# Patient Record
Sex: MALE | Race: WHITE | URBAN - METROPOLITAN AREA
[De-identification: names, ages, dates, MRNs, and addresses within clinical notes are randomized per-mention and may not be internally consistent; named-entity substitution may affect disease eponyms.]

---

## 2020-02-19 ENCOUNTER — OFFICE VISIT (OUTPATIENT)
Dept: PULMONOLOGY | Age: 75
End: 2020-02-19
Payer: MEDICARE

## 2020-02-19 VITALS
OXYGEN SATURATION: 97 % | HEART RATE: 75 BPM | SYSTOLIC BLOOD PRESSURE: 134 MMHG | DIASTOLIC BLOOD PRESSURE: 60 MMHG | HEIGHT: 70 IN | BODY MASS INDEX: 30.64 KG/M2 | WEIGHT: 214 LBS

## 2020-02-19 PROBLEM — I15.2 HYPERTENSION ASSOCIATED WITH DIABETES (HCC): Status: ACTIVE | Noted: 2017-01-04

## 2020-02-19 PROBLEM — G47.33 OBSTRUCTIVE SLEEP APNEA SYNDROME: Chronic | Status: ACTIVE | Noted: 2020-02-19

## 2020-02-19 PROBLEM — E66.9 OBESITY (BMI 30-39.9): Status: ACTIVE | Noted: 2019-08-26

## 2020-02-19 PROBLEM — E66.9 OBESITY (BMI 30-39.9): Chronic | Status: ACTIVE | Noted: 2019-08-26

## 2020-02-19 PROBLEM — E11.59 HYPERTENSION ASSOCIATED WITH DIABETES (HCC): Status: ACTIVE | Noted: 2017-01-04

## 2020-02-19 PROBLEM — E11.59 HYPERTENSION ASSOCIATED WITH DIABETES (HCC): Chronic | Status: ACTIVE | Noted: 2017-01-04

## 2020-02-19 PROBLEM — G47.33 OBSTRUCTIVE SLEEP APNEA SYNDROME: Status: ACTIVE | Noted: 2020-02-19

## 2020-02-19 PROBLEM — F32.A DEPRESSION: Status: ACTIVE | Noted: 2020-02-19

## 2020-02-19 PROBLEM — J30.9 ALLERGIC RHINITIS: Status: ACTIVE | Noted: 2020-02-19

## 2020-02-19 PROBLEM — I15.2 HYPERTENSION ASSOCIATED WITH DIABETES (HCC): Chronic | Status: ACTIVE | Noted: 2017-01-04

## 2020-02-19 PROCEDURE — 99204 OFFICE O/P NEW MOD 45 MIN: CPT | Performed by: INTERNAL MEDICINE

## 2020-02-19 ASSESSMENT — ENCOUNTER SYMPTOMS
CHOKING: 0
NAUSEA: 0
SHORTNESS OF BREATH: 0
PHOTOPHOBIA: 0
ALLERGIC/IMMUNOLOGIC NEGATIVE: 1
VOMITING: 0
EYE PAIN: 0
CHEST TIGHTNESS: 0
APNEA: 0
ABDOMINAL DISTENTION: 0
ABDOMINAL PAIN: 0
RHINORRHEA: 0

## 2020-02-19 ASSESSMENT — SLEEP AND FATIGUE QUESTIONNAIRES
HOW LIKELY ARE YOU TO NOD OFF OR FALL ASLEEP WHILE WATCHING TV: 1
HOW LIKELY ARE YOU TO NOD OFF OR FALL ASLEEP WHILE SITTING QUIETLY AFTER LUNCH WITHOUT ALCOHOL: 2
HOW LIKELY ARE YOU TO NOD OFF OR FALL ASLEEP WHEN YOU ARE A PASSENGER IN A CAR FOR AN HOUR WITHOUT A BREAK: 2
HOW LIKELY ARE YOU TO NOD OFF OR FALL ASLEEP WHILE SITTING AND TALKING TO SOMEONE: 0
HOW LIKELY ARE YOU TO NOD OFF OR FALL ASLEEP WHILE LYING DOWN TO REST IN THE AFTERNOON WHEN CIRCUMSTANCES PERMIT: 3
ESS TOTAL SCORE: 10
HOW LIKELY ARE YOU TO NOD OFF OR FALL ASLEEP WHILE SITTING INACTIVE IN A PUBLIC PLACE: 1
NECK CIRCUMFERENCE (INCHES): 17
HOW LIKELY ARE YOU TO NOD OFF OR FALL ASLEEP WHILE SITTING AND READING: 1
HOW LIKELY ARE YOU TO NOD OFF OR FALL ASLEEP IN A CAR, WHILE STOPPED FOR A FEW MINUTES IN TRAFFIC: 0

## 2020-02-19 NOTE — LETTER
he gets a new machine. 12 month f/u. Pt would medically benefit from wt loss for LELAND (diet, exercise, surgical). If you have questions or concerns, please do not hesitate to call me. I look forward to following Charlene Clemente along with you.     Sincerely,      Susy Reza MD     providers:  Kimi Medina. 41: [phone]

## 2020-02-19 NOTE — PROGRESS NOTES
Lizbeth Chavez MD, FAASM, [facility]  [facility] HEART AND SURGICAL [address]  3rd Floor,  [address] [facility], [facility] [place] Jaime E [phone]   [facility] [address] Rainer Isaac. [address] [facility]. Jessica [address] [phone]     [place] [address]  Dept: [phone]  Loc: [phone]    Assessment:      Visit Diagnoses and Associated Orders     Obstructive sleep apnea syndrome   (New Problem)  -  Primary    Reviewed old records, on Tx         Hypertension associated with diabetes ([address]   (Stable)           Type 2 diabetes mellitus with microalbuminuria, with long-term current use of insulin (HCC)   (Stable)           Allergic rhinitis, unspecified seasonality, unspecified trigger   (Stable)           Depression, unspecified depression type   (Stable)           Obesity (BMI 30-39.9)   (Stable)                  Plan:      Reviewed old records, pertinent data listed. Reviewed compliance download with pt. Supplies and parts as needed for his machine. These are medically necessary. Continue medications per his PCP and other physicians. Limit caffeine use after 3pm.  Encouraged him to work on weight loss through diet and exercise. The primary encounter diagnosis was Obstructive sleep apnea syndrome. Diagnoses of Hypertension associated with diabetes ([address] Type 2 diabetes mellitus with microalbuminuria, with long-term current use of insulin ([address] Allergic rhinitis, unspecified seasonality, unspecified trigger, Depression, unspecified depression type, and Obesity (BMI 30-39. 9) were also pertinent to this visit. The chronic medical conditions listed are directly related to the primary diagnosis listed above. The management of the primary diagnosis affects the secondary diagnosis and vice versa. May needs a new machine at any time. Needs repeat bilevel titration when he gets a new machine. 12 month f/u.     Pt would medically benefit from wt loss for LELAND (diet, exercise, surgical). Subjective:     Patient ID: Monae Bergeron is a 69 y.o. male. Chief Complaint   Patient presents with    Sleep Apnea       HPI:      Monae Bergeron is a 69 y.o. male self-referred for a sleep evaluation. He complains of: snoring, witnessed apneas, excessive daytime sleepiness , non-restorative sleep, nocturia and tossing and turning at night. He denies: cataplexy and hypnagogic hallucinations. Symptoms began 7 years ago, controlled since that time on bilevel. Patient has previously failed CPAP and was changed to bilevel. He continues to do well with his machine at the current settings. No issues with EDS, snoring, or apneas. He is waking refreshed, for the most part, in the am.  No HA, dryness, or congestion. Having some mask leak issues, wants to try a full face mask. Machine Modem/Download Info:  Compliance (hours/night): 8 hrs/night  Download AHI (/hour): 3 /HR  Average IPAP Pressure: 12.9 cmH2O  Average EPAP Pressure: 9.7 cmH2O AUTO BIPAP - Settings (Charlette)  IPAP Max: 20 cmH2O  EPAP Min: 9 cmH2O  Pressure Support Min: 2  Pressure Support Max: 8   Comfort Settings  Humidity Level (0-8): 5  Heated Tubing (Yes/No): No  Flex/EPR (0-3): 1 PAP Mask  Mask Type: Nasal mask  Mask Model: Dreamwear  Mask Size: sm     Hypertension, diabetes mellitus, allergic rhinitis, depression, and obesity: stable on meds and followed by pt's PCP and other physicians. Previous evaluation and treatment has included- reviewed studies from [place] [address]:  polysomnography done on 7/8/04 showed AHI-102.9/hr with low sat-82%. Had CPAP titration on 8/10/04 and 12/22/09. DOT/CDL - No  FAA/'s license -No    Previous Report(s) Reviewed: historical medical records, office notes, andreferral letter(s). Pertinent data has been documented.     [place] - Total score: 10    Caffeine Intake - Coffee: 1 cup(s) per day    Social History     Socioeconomic History    Marital status:      Spouse name: Not on file    Number of children: Not on file    Years of education: Not on file    Highest education level: Not on file   Occupational History    Not on file   Social Needs    Financial resource strain: Not on file    Food insecurity:     Worry: Not on file     Inability: Not on file    Transportation needs:     Medical: Not on file     Non-medical: Not on file   Tobacco Use    Smoking status: Never Smoker    Smokeless tobacco: Never Used   Substance and Sexual Activity    Alcohol use: Yes     Comment: RARELY    Drug use: No    Sexual activity: Not on file   Lifestyle    Physical activity:     Days per week: Not on file     Minutes per session: Not on file    Stress: Not on file   Relationships    Social connections:     Talks on phone: Not on file     Gets together: Not on file     Attends Religion service: Not on file     Active member of club or organization: Not on file     Attends meetings of clubs or organizations: Not on file     Relationship status: Not on file    Intimate partner violence:     Fear of current or ex partner: Not on file     Emotionally abused: Not on file     Physically abused: Not on file     Forced sexual activity: Not on file   Other Topics Concern    Not on file   Social History Narrative    Not on file        Current Outpatient Medications   Medication Sig Dispense Refill    lisinopril (PRINIVIL;ZESTRIL) 10 MG tablet Take 20 mg by mouth daily       insulin detemir (LEVEMIR FLEXPEN) 100 UNIT/ML injection pen Inject 55 Units into the skin nightly       sertraline (ZOLOFT) 50 MG tablet Take 50 mg by mouth daily.  cetirizine (ZYRTEC) 5 MG tablet Take 5 mg by mouth daily.  metFORMIN (GLUCOPHAGE) 500 MG tablet Take 500 mg by mouth 2 times daily (with meals). No current facility-administered medications for this visit.         Allergies as of 02/19/2020 - Review Complete 02/19/2020   Allergen Reaction

## 2021-04-05 ENCOUNTER — OFFICE VISIT (OUTPATIENT)
Dept: PULMONOLOGY | Age: 76
End: 2021-04-05
Payer: MEDICARE

## 2021-04-05 VITALS
HEIGHT: 70 IN | OXYGEN SATURATION: 95 % | WEIGHT: 212 LBS | BODY MASS INDEX: 30.35 KG/M2 | HEART RATE: 70 BPM | SYSTOLIC BLOOD PRESSURE: 123 MMHG | DIASTOLIC BLOOD PRESSURE: 73 MMHG

## 2021-04-05 DIAGNOSIS — E66.9 OBESITY (BMI 30-39.9): Chronic | ICD-10-CM

## 2021-04-05 DIAGNOSIS — E11.59 HYPERTENSION ASSOCIATED WITH DIABETES (HCC): Chronic | ICD-10-CM

## 2021-04-05 DIAGNOSIS — G47.33 OBSTRUCTIVE SLEEP APNEA SYNDROME: Chronic | ICD-10-CM

## 2021-04-05 DIAGNOSIS — I15.2 HYPERTENSION ASSOCIATED WITH DIABETES (HCC): Chronic | ICD-10-CM

## 2021-04-05 PROCEDURE — 99213 OFFICE O/P EST LOW 20 MIN: CPT | Performed by: INTERNAL MEDICINE

## 2021-04-05 ASSESSMENT — SLEEP AND FATIGUE QUESTIONNAIRES
HOW LIKELY ARE YOU TO NOD OFF OR FALL ASLEEP WHILE LYING DOWN TO REST IN THE AFTERNOON WHEN CIRCUMSTANCES PERMIT: 3
HOW LIKELY ARE YOU TO NOD OFF OR FALL ASLEEP WHILE SITTING AND TALKING TO SOMEONE: 1
HOW LIKELY ARE YOU TO NOD OFF OR FALL ASLEEP WHILE WATCHING TV: 2
HOW LIKELY ARE YOU TO NOD OFF OR FALL ASLEEP WHEN YOU ARE A PASSENGER IN A CAR FOR AN HOUR WITHOUT A BREAK: 2

## 2021-04-05 NOTE — PROGRESS NOTES
Naina Stephenson MD, Charisse Martinez, [facility]  Tiffanie Kehrt CNP  Junaidcandace Martinez CNP [address] [place] [address]  [place]ise[place] [address] [facility] [address] [facility] [phone]   [facility] [facility] [address] [place]. [address] [facility]. Jessica [address] [phone]     [place] [id number] [phone]      Assessment/Plan:     1. Obstructive sleep apnea syndrome  Assessment & Plan:  Chronic-Stable: Reviewed and analyzed results of physiologic download from patient's machine and reviewed with patient. Supplies and parts as needed for his machine. These are medically necessary. Limit caffeine use after 3pm. Based on the analyzed data will continue with current settings. Current machine is over 11years old and not functioning adequately. Patient needs a new machine, order has been placed to his DME company of choice. We will see the patient back in a week follow-up. 2. Hypertension associated with diabetes ([address]  Assessment & Plan:  Chronic- Stable. Discussed the importance of treating obstructive sleep apnea as part of the management of this disorder. Cont any meds per PCP and other physicians. 3. Obesity (BMI 30-39. 9)  Assessment & Plan:  Chronic-not stable:  Discussed importance of treating obstructive sleep apnea and getting sufficient sleep to assist with weight control. Encouraged him to work on weight loss through diet and exercise. Recommended DASH or Mediterranean diets. Reviewed, analyzed, and documented physiologic data from patient's PAP machine. This information was analyzed to assess complexity and medical decision making in regards to further testing and management. Diagnoses of Obstructive sleep apnea syndrome, Hypertension associated with diabetes ([address] and Obesity (BMI 30-39. 9) were pertinent to this visit. The chronic medical conditions listed are directly related to the primary diagnosis listed above.   The management of the primary diagnosis affects the secondary diagnosis and vice versa. Subjective:     Patient ID: Eric Schwarz is a 77 y.o. male. Chief Complaint   Patient presents with    Sleep Apnea    Fatigue       HPI:    Machine Modem/Download Info:  Compliance (hours/night): 8.3 hrs/night  Download AHI (/hour): 2.2 /HR  Average IPAP Pressure: 13.9 cmH2O  Average EPAP Pressure: 10 cmH2O   AUTO BIPAP - Settings (Charlette)  IPAP Max: 20 cmH2O  EPAP Min: 9 cmH2O  Pressure Support Min: 2  Pressure Support Max: 8   Comfort Settings  Humidity Level (0-8): 4  Flex/EPR (0-3): 1       Patient's machine is over 11years old and not turning on consistently. It is also missing the power button which makes it difficult to turn his machine on and off. Otherwise he feels he is sleeping well and the pressure feels good. Is using a chinstrap because his mouth opens and was not able to tolerate a full face mask.     [facility]    [place] - Total score: 14    Social History     Socioeconomic History    Marital status:      Spouse name: Not on file    Number of children: Not on file    Years of education: Not on file    Highest education level: Not on file   Occupational History    Not on file   Social Needs    Financial resource strain: Not on file    Food insecurity     Worry: Not on file     Inability: Not on file    Transportation needs     Medical: Not on file     Non-medical: Not on file   Tobacco Use    Smoking status: Never Smoker    Smokeless tobacco: Never Used   Substance and Sexual Activity    Alcohol use: Yes     Comment: RARELY    Drug use: No    Sexual activity: Not on file   Lifestyle    Physical activity     Days per week: Not on file     Minutes per session: Not on file    Stress: Not on file   Relationships    Social connections     Talks on phone: Not on file     Gets together: Not on file     Attends Holiness service: Not on file     Active member of club or organization: Not on file Attends meetings of clubs or organizations: Not on file     Relationship status: Not on file    Intimate partner violence     Fear of current or ex partner: Not on file     Emotionally abused: Not on file     Physically abused: Not on file     Forced sexual activity: Not on file   Other Topics Concern    Not on file   Social History Narrative    Not on file       Current Outpatient Medications   Medication Sig Dispense Refill    lisinopril (PRINIVIL;ZESTRIL) 10 MG tablet Take 20 mg by mouth daily       insulin detemir (LEVEMIR FLEXPEN) 100 UNIT/ML injection pen Inject 55 Units into the skin nightly       sertraline (ZOLOFT) 50 MG tablet Take 50 mg by mouth daily.  cetirizine (ZYRTEC) 5 MG tablet Take 5 mg by mouth daily.  metFORMIN (GLUCOPHAGE) 500 MG tablet Take 500 mg by mouth 2 times daily (with meals). No current facility-administered medications for this visit. Allergies as of 04/05/2021 - Review Complete 04/05/2021   Allergen Reaction Noted    Erythromycin base Nausea Only 10/02/2014       Patient Active Problem List   Diagnosis    Port-A-Cath in place    Malignant neoplasm of pancreas ([facility]    Encounter for follow-up examination after completed treatment for malignant neoplasm    Obstructive sleep apnea syndrome    Hyperlipidemia associated with type 2 diabetes mellitus ([facility]    Hypertension associated with diabetes ([facility]    Type 2 diabetes mellitus with microalbuminuria, with long-term current use of insulin ([facility])    Obesity (BMI 30-39. 9)    Allergic rhinitis    Depression            Vitals:  Weight BMI   Wt Readings from Last 3 Encounters:   04/05/21 212 lb (96.2 kg)   02/19/20 214 lb (97.1 kg)   04/19/16 219 lb (99.3 kg)    Body mass index is 30.42 kg/m².      BP HR SaO2   BP Readings from Last 3 Encounters:   04/05/21 123/73   02/19/20 134/60   04/19/16 126/70    Pulse Readings from Last 3 Encounters:   04/05/21 70   02/19/20 75   04/19/16 72    SpO2 Readings from Last 3 Encounters:   04/05/21 95%   02/19/20 97%   09/03/15 97%            Electronically signed by Yesy Dias MD on 4/5/2021 at 12:47 PM

## 2021-04-05 NOTE — ASSESSMENT & PLAN NOTE
Chronic-Stable: Reviewed and analyzed results of physiologic download from patient's machine and reviewed with patient. Supplies and parts as needed for his machine. These are medically necessary. Limit caffeine use after 3pm. Based on the analyzed data will continue with current settings. Current machine is over 11years old and not functioning adequately. Patient needs a new machine, order has been placed to his DME company of choice. We will see the patient back in a week follow-up.

## 2021-04-05 NOTE — LETTER
[facility] Sleep Medicine  [address] [facility]  Lisbeth Martinez  [address]  Phone: [phone]  Fax: [phone]    April 5, 2021       Patient: Ambrosio Cuevas   MR Number: [id number]   YOB: 1945   Date of Visit: 4/5/2021       Bienvenido Ramesh was seen for a follow up visit today. Here is my assessment and plan as well as an attached copy of his visit today:    Obstructive sleep apnea syndrome  Chronic-Stable: Reviewed and analyzed results of physiologic download from patient's machine and reviewed with patient. Supplies and parts as needed for his machine. These are medically necessary. Limit caffeine use after 3pm. Based on the analyzed data will continue with current settings. Current machine is over 11years old and not functioning adequately. Patient needs a new machine, order has been placed to his DME company of choice. We will see the patient back in a week follow-up. Hypertension associated with diabetes ([facility]  Chronic- Stable. Discussed the importance of treating obstructive sleep apnea as part of the management of this disorder. Cont any meds per PCP and other physicians. Obesity (BMI 30-39. 9)  Chronic-not stable:  Discussed importance of treating obstructive sleep apnea and getting sufficient sleep to assist with weight control. Encouraged him to work on weight loss through diet and exercise. Recommended DASH or Mediterranean diets. If you have questions or concerns, please do not hesitate to call me. I look forward to following Dixie Olivas along with you.     Sincerely,    Bhargavi Keith MD    CC providers:  [facility] [place]  [phone]  Via Fax: [phone]

## 2021-04-05 NOTE — ASSESSMENT & PLAN NOTE
Chronic-not stable:  Discussed importance of treating obstructive sleep apnea and getting sufficient sleep to assist with weight control. Encouraged him to work on weight loss through diet and exercise. Recommended DASH or Mediterranean diets.

## 2021-04-05 NOTE — PROGRESS NOTES
Duong Wood         : 1945    Diagnosis: [x] LELAND (G47.33) [] CSA (G47.31) [] Apnea (G47.30)   Length of Need: [x] 12 Months [] 99 Months [] Other:    Machine (YON!): [x] Respironics Dream Station      Auto [] ResMed AirSense     Auto [] Other:     []  CPAP () [x] Bilevel ()   Mode: [] Auto [] Spontaneous    Mode: [x] Auto [] Spontaneous            AUTO BIPAP - Settings (Charlette)  IPAP Max: 20 cmH2O  EPAP Min: 9 cmH2O  Pressure Support Min: 2  Pressure Support Max: 8          Comfort Settings:   - Ramp Pressure: 5 cmH2O                                        - Ramp time: 15 min                                     -  Flex/EPR - 3 full time                                    - For ResMed Bilevel (TiMax-4 sec   TiMin- 0.2 sec)     Humidifier: [x] Heated ()        [x] Water chamber replacement ()/ 1 per 6 months        Mask:   [x] Nasal () /1 per 3 months [] Full Face () /1 per 3 months   [x] Patient choice -Size and fit mask [] Patient Choice - Size and fit mask   [] Dispense:  [] Dispense:    [x] Headgear () / 1 per 3 months [] Headgear () / 1 per 3 months   [x] Replacement Nasal Cushion ()/2 per month [] Interface Replacement ()/1 per month   [x] Replacement Nasal Pillows ()/2 per month         Tubing: [x] Heated ()/1 per 3 months    [] Standard ()/1 per 3 months [] Other:           Filters: [x] Non-disposable ()/1 per 6 months     [x] Ultra-Fine, Disposable ()/2 per month        Miscellaneous: [x] Chin Strap ()/ 1 per 6 months [] O2 bleed-in:       LPM   [] Oximetry on CPAP/Bilevel []  Other:    [x] Modem: ()         Start Order Date: 21    MEDICAL JUSTIFICATION:  I, the undersigned, certify that the above prescribed supplies are medically necessary for this patients wellbeing.   In my opinion, the supplies are both reasonable and necessary in reference to accepted standards of medicalpractice in treatment of this patients condition. Mika Marin MD      NPI: [id number]       Order Signed Date: 21    Electronically signed by Mika Marin MD on 2021 at 12:48 PM    [facility]  1945  [address] [address]  [phone] (home)   [phone] (mobile)      Insurance Info (confirm with patient if correct):  Payor/Plan Subscr  Sex Relation Sub. Ins. ID Effective Group Num   1.  [address] [place] [address] E 1945 Male Self [id number]                                    [address]

## 2021-07-13 ENCOUNTER — VIRTUAL VISIT (OUTPATIENT)
Dept: PULMONOLOGY | Age: 76
End: 2021-07-13
Payer: MEDICARE

## 2021-07-13 DIAGNOSIS — G47.33 OBSTRUCTIVE SLEEP APNEA SYNDROME: Primary | Chronic | ICD-10-CM

## 2021-07-13 DIAGNOSIS — J30.9 ALLERGIC RHINITIS, UNSPECIFIED SEASONALITY, UNSPECIFIED TRIGGER: ICD-10-CM

## 2021-07-13 DIAGNOSIS — I15.2 HYPERTENSION ASSOCIATED WITH DIABETES (HCC): Chronic | ICD-10-CM

## 2021-07-13 DIAGNOSIS — E11.29 TYPE 2 DIABETES MELLITUS WITH MICROALBUMINURIA, WITH LONG-TERM CURRENT USE OF INSULIN (HCC): Chronic | ICD-10-CM

## 2021-07-13 DIAGNOSIS — R80.9 TYPE 2 DIABETES MELLITUS WITH MICROALBUMINURIA, WITH LONG-TERM CURRENT USE OF INSULIN (HCC): Chronic | ICD-10-CM

## 2021-07-13 DIAGNOSIS — E66.9 OBESITY (BMI 30-39.9): Chronic | ICD-10-CM

## 2021-07-13 DIAGNOSIS — Z79.4 TYPE 2 DIABETES MELLITUS WITH MICROALBUMINURIA, WITH LONG-TERM CURRENT USE OF INSULIN (HCC): Chronic | ICD-10-CM

## 2021-07-13 DIAGNOSIS — E11.59 HYPERTENSION ASSOCIATED WITH DIABETES (HCC): Chronic | ICD-10-CM

## 2021-07-13 PROCEDURE — 99213 OFFICE O/P EST LOW 20 MIN: CPT | Performed by: NURSE PRACTITIONER

## 2021-07-13 ASSESSMENT — SLEEP AND FATIGUE QUESTIONNAIRES
HOW LIKELY ARE YOU TO NOD OFF OR FALL ASLEEP WHILE SITTING QUIETLY AFTER LUNCH WITHOUT ALCOHOL: 1
ESS TOTAL SCORE: 9
HOW LIKELY ARE YOU TO NOD OFF OR FALL ASLEEP IN A CAR, WHILE STOPPED FOR A FEW MINUTES IN TRAFFIC: 0
HOW LIKELY ARE YOU TO NOD OFF OR FALL ASLEEP WHILE SITTING AND READING: 1
HOW LIKELY ARE YOU TO NOD OFF OR FALL ASLEEP WHILE WATCHING TV: 1
HOW LIKELY ARE YOU TO NOD OFF OR FALL ASLEEP WHILE LYING DOWN TO REST IN THE AFTERNOON WHEN CIRCUMSTANCES PERMIT: 2
HOW LIKELY ARE YOU TO NOD OFF OR FALL ASLEEP WHILE SITTING AND TALKING TO SOMEONE: 0
HOW LIKELY ARE YOU TO NOD OFF OR FALL ASLEEP WHILE SITTING INACTIVE IN A PUBLIC PLACE: 1
HOW LIKELY ARE YOU TO NOD OFF OR FALL ASLEEP WHEN YOU ARE A PASSENGER IN A CAR FOR AN HOUR WITHOUT A BREAK: 3

## 2021-07-13 NOTE — PROGRESS NOTES
Zoey Doe MD, Halie Moore, [facility]  Tiffanie Kehrt CNP Paola Hackney CNP [address] [place] [address]  Mayur Aguiar [facility], [address] [facility] [phone]   [facility] [address]  Mayur Aguiar. [address] [place] [address]. Jessica [address] [phone]     Video Visit- Follow up    Pursuant to the emergency declaration under the [address] [place] [address], [facility] waiver authority and the [employer] Act, this Virtual  Visit was conducted, with patient's consent, to reduce the patient's risk of exposure to COVID-19 and provide continuity of care for an established patient. Services were provided through a video synchronous discussion virtually to substitute for in-person clinic visit. Patient was located in their home. Assessment/Plan:      1. Obstructive sleep apnea syndrome  Assessment & Plan:  Chronic-Stable: Reviewed and analyzed results of physiologic download from patient's machine and reviewed with patient. Supplies and parts as needed for his machine. These are medically necessary. Limit caffeine use after 3pm. Based on the analyzed data will continue with current settings. Will send order for a chin strap. Follow up in 1 year or sooner if issues arise. 2. Hypertension associated with diabetes ([address]  Assessment & Plan:  Chronic- Stable. Discussed the importance of treating obstructive sleep apnea as part of the management of this disorder. Cont any meds per PCP and other physicians. 3. Type 2 diabetes mellitus with microalbuminuria, with long-term current use of insulin (HCC)  Assessment & Plan:  Chronic- Stable. Discussed the importance of treating obstructive sleep apnea as part of the management of this disorder. Cont any meds per PCP and other physicians. 4. Obesity (BMI 30-39. 9)  Assessment & Plan:  Chronic-not stable:  Discussed importance of treating obstructive sleep apnea and getting sufficient sleep to assist with weight control.   Encouraged him to work on weight loss through diet and exercise. Recommended DASH or Mediterranean diets. 5. Allergic rhinitis, unspecified seasonality, unspecified trigger  Assessment & Plan:  Chronic- Stable. Discussed the importance of treating obstructive sleep apnea as part of the management of this disorder. Cont any meds per PCP and other physicians. Reviewed, analyzed, and documented physiologic data from patient's PAP machine. This information was analyzed to assess complexity and medical decision making in regards to further testing and management. The primary encounter diagnosis was Obstructive sleep apnea syndrome. Diagnoses of Hypertension associated with diabetes ([facility] Type 2 diabetes mellitus with microalbuminuria, with long-term current use of insulin ([facility] Obesity (BMI 30-39.9), and Allergic rhinitis, unspecified seasonality, unspecified trigger were also pertinent to this visit. The chronic medical conditions listed are directly related to the primary diagnosis listed above. The management of the primary diagnosis affects the secondary diagnosis and vice versa. Subjective:     Patient ID: Ovi Costa is a 77 y.o. male. Chief Complaint   Patient presents with    Sleep Apnea     Subjective   HPI:    Machine Modem/Download Info:  Compliance (hours/night): 8 hrs/night  % of nights >= 4 hrs: 100 %  Download AHI (/hour): 2.3 /HR   Average IPAP Pressure: 13.9 cmH2O  Average EPAP Pressure: 9.5 cmH2O         AUTO BIPAP - Settings (Charlette)  IPAP Max: 20 cmH2O  EPAP Min: 9 cmH2O  Pressure Support Min: 2  Pressure Support Max: 8             Comfort Settings  Humidity Level (0-8): 5  Heated Tubing (Yes/No): Yes  Flex/EPR (0-3): 3 PAP Mask  Mask Type: Nasal mask  Clinically Relevant Leak: No     Ovi Costa is doing well with his new machine. Pressure feels good and he is waking rested. he denies headaches, congestion, nosebleeds, dryness, aerophagia, or drowsiness while driving.  Using a chin strap to help keep mouth closed with his nasal mask. He would like to try a different chin strap. He is not interested in trying a full face mask at this time. Discussed the recall of dreamstation devices with patient and the severity of his sleep apnea. Discussed the risks of untreated sleep apnea including but not limited to car accidents, heart attacks, strokes, and death. Alternative therapy may not exist or may be severely limited. Felt the benefits of continued usage of these devices may outweigh the risks identified in the recall notification. Advised to avoid use of unproven cleaning methods, such as ozone. Due to the unknown variables each patient will have to make a determination in his/her own. Please contact your equipment company for further instruction until an alternative is found. Encouraged patient to go to [employer] to register his machine for the recall.          [facility]    [place] - Total score: 9    Current Outpatient Medications   Medication Instructions    cetirizine (ZYRTEC) 5 mg, Oral, DAILY    insulin detemir (LEVEMIR FLEXPEN) 55 Units, Subcutaneous, NIGHTLY    lisinopril (PRINIVIL;ZESTRIL) 20 mg, Oral, DAILY    metFORMIN (GLUCOPHAGE) 500 mg, Oral, 2 TIMES DAILY WITH MEALS    sertraline (ZOLOFT) 50 mg, Oral, DAILY        Electronically signed by AVI Knight on 7/13/2021 at 10:15 AM

## 2021-07-13 NOTE — Clinical Note
[facility] Sleep Medicine  [address] [facility]  Lisbeth Martinez [address] [address]  Phone: [phone]  Fax: [phone]    AVI Espinal    July 13, 2021     Neida Fregoso MD  [id number]    Patient: Thomas Payton   MR Number: [id number]   YOB: 1945   Date of Visit: 7/13/2021       Dear Neida Fregoso MD:    Thank you for referring Herbert Gayle to me for evaluation/treatment. Below are the relevant portions of my assessment and plan of care. If you have questions, please do not hesitate to call me. I look forward to following Valerie Mathews along with you.     Sincerely,    AVI Espinal APRN

## 2021-07-13 NOTE — PROGRESS NOTES
Diagnosis: [x] LELAND (G47.33) [] CSA (G47.31) [] Apnea (G47.30)   Length of Need: [x] 15 Months [] 99 Months [] Other:   Machine (YON!): [] Respironics Dream Station      Auto [] ResMed AirSense     Auto [] Other:     []  CPAP () [] Bilevel ()   Mode: [] Auto [] Spontaneous    Mode: [] Auto [] Spontaneous                      Comfort Settings: Ramp Pressure: 5 cmH2O  Ramp time: 15 min  Flex/EPR - 3 full time                                  For ResMed Bileve (TiMax-4 sec   TiMin- 0.2 sec)     Humidifier: [] Heated ()        [x] Water chamber replacement ()/ 1 per 6 months        Mask:   [x] Nasal () /1 per 3 months [] Full Face () /1 per 3 months   [x] Patient choice -Size and fit mask [] Patient Choice - Size and fit mask   [] Dispense: [] Dispense:   [x] Headgear () / 1 per 3 months [] Headgear () / 1 per 3 months   [x] Replacement Nasal Cushion ()/2 per month [] Interface Replacement ()/1 per month   [x] Replacement Nasal Pillows ()/2 per month         Tubing: [x] Heated ()/1 per 3 months    [] Standard ()/1 per 3 months [] Other:           Filters: [x] Non-disposable ()/1 per 6 months     [x] Ultra-Fine, Disposable ()/2 per month        Miscellaneous: [x] Chin Strap ()/ 1 per 6 months [] O2 bleed-in:        LPM   [] Oxymetry on CPAP/Bilevel []  Other:         Start Order Date: 07/13/21    MEDICAL JUSTIFICATION:  I, the undersigned, certify that the above prescribed supplies are medically necessary for this patients wellbeing. In my opinion, the supplies are both reasonable and necessary in reference to accepted standards of medicalpractice in treatment of this patients condition. Junito Perez NP    NPI: [id number]       Order Signed Date: 07/13/21  [address] [place] [address]  Pulmonary, Sleep, and Critical Care    Pulmonary, Sleep, and Critical Care  [address] [facility].  [address], [address] [facility] , [address]                                    [facility]  Phone: [phone]    Fax: [phone]    Nino Brown  1945  [address] [address]  [phone] (home)   [phone] (mobile)      Insurance Info (confirm with patient if correct):  Payor/Plan Subscr  Sex Relation Sub. Ins. ID Effective Group Num   1. [address] 1945 Male  [id number] Not Eff                                    [address]   2.  [address] 1945 Male Self [id number]                                   [address]

## 2021-07-13 NOTE — ASSESSMENT & PLAN NOTE
Chronic-Stable: Reviewed and analyzed results of physiologic download from patient's machine and reviewed with patient. Supplies and parts as needed for his machine. These are medically necessary. Limit caffeine use after 3pm. Based on the analyzed data will continue with current settings. Will send order for a chin strap. Follow up in 1 year or sooner if issues arise.

## 2021-07-13 NOTE — LETTER
[facility] Sleep Medicine  [address] [facility]  [address] [address]  Phone: [phone]  Fax: [phone]    July 13, 2021       Patient: Dipti Rosas   MR Number: [id number]   YOB: 1945   Date of Visit: 7/13/2021       Becca Flowers was seen for a follow up visit today. Here is my assessment and plan as well as an attached copy of his visit today:    Obesity (BMI 30-39. 9)  Chronic-not stable:  Discussed importance of treating obstructive sleep apnea and getting sufficient sleep to assist with weight control. Encouraged him to work on weight loss through diet and exercise. Recommended DASH or Mediterranean diets. Type 2 diabetes mellitus with microalbuminuria, with long-term current use of insulin (HCC)  Chronic- Stable. Discussed the importance of treating obstructive sleep apnea as part of the management of this disorder. Cont any meds per PCP and other physicians. Hypertension associated with diabetes ([address]  Chronic- Stable. Discussed the importance of treating obstructive sleep apnea as part of the management of this disorder. Cont any meds per PCP and other physicians. Allergic rhinitis  Chronic- Stable. Discussed the importance of treating obstructive sleep apnea as part of the management of this disorder. Cont any meds per PCP and other physicians. Obstructive sleep apnea syndrome  Chronic-Stable: Reviewed and analyzed results of physiologic download from patient's machine and reviewed with patient. Supplies and parts as needed for his machine. These are medically necessary. Limit caffeine use after 3pm. Based on the analyzed data will continue with current settings. Will send order for a chin strap. Follow up in 1 year or sooner if issues arise. If you have questions or concerns, please do not hesitate to call me. I look forward to following Jose Forbes along with you.     Sincerely,    AVI Wills    CC providers:  Soham Soto MD  [address] [facility] [address]  Via Fax: [phone]

## 2021-07-14 ENCOUNTER — TELEPHONE (OUTPATIENT)
Dept: PULMONOLOGY | Age: 76
End: 2021-07-14

## 2023-11-21 ENCOUNTER — OFFICE VISIT (OUTPATIENT)
Dept: PULMONOLOGY | Age: 78
End: 2023-11-21
Payer: MEDICARE

## 2023-11-21 ENCOUNTER — TELEPHONE (OUTPATIENT)
Dept: PULMONOLOGY | Age: 78
End: 2023-11-21

## 2023-11-21 VITALS
SYSTOLIC BLOOD PRESSURE: 122 MMHG | HEIGHT: 70 IN | DIASTOLIC BLOOD PRESSURE: 70 MMHG | OXYGEN SATURATION: 96 % | WEIGHT: 207 LBS | BODY MASS INDEX: 29.63 KG/M2 | HEART RATE: 63 BPM

## 2023-11-21 DIAGNOSIS — R80.9 TYPE 2 DIABETES MELLITUS WITH MICROALBUMINURIA, WITH LONG-TERM CURRENT USE OF INSULIN (HCC): Chronic | ICD-10-CM

## 2023-11-21 DIAGNOSIS — Z79.4 TYPE 2 DIABETES MELLITUS WITH MICROALBUMINURIA, WITH LONG-TERM CURRENT USE OF INSULIN (HCC): Chronic | ICD-10-CM

## 2023-11-21 DIAGNOSIS — E11.29 TYPE 2 DIABETES MELLITUS WITH MICROALBUMINURIA, WITH LONG-TERM CURRENT USE OF INSULIN (HCC): Chronic | ICD-10-CM

## 2023-11-21 DIAGNOSIS — G47.33 OBSTRUCTIVE SLEEP APNEA SYNDROME: Primary | Chronic | ICD-10-CM

## 2023-11-21 DIAGNOSIS — E11.59 HYPERTENSION ASSOCIATED WITH DIABETES (HCC): Chronic | ICD-10-CM

## 2023-11-21 DIAGNOSIS — I15.2 HYPERTENSION ASSOCIATED WITH DIABETES (HCC): Chronic | ICD-10-CM

## 2023-11-21 DIAGNOSIS — J30.9 ALLERGIC RHINITIS, UNSPECIFIED SEASONALITY, UNSPECIFIED TRIGGER: ICD-10-CM

## 2023-11-21 PROCEDURE — 1123F ACP DISCUSS/DSCN MKR DOCD: CPT | Performed by: NURSE PRACTITIONER

## 2023-11-21 PROCEDURE — 3074F SYST BP LT 130 MM HG: CPT | Performed by: NURSE PRACTITIONER

## 2023-11-21 PROCEDURE — 99214 OFFICE O/P EST MOD 30 MIN: CPT | Performed by: NURSE PRACTITIONER

## 2023-11-21 PROCEDURE — 3078F DIAST BP <80 MM HG: CPT | Performed by: NURSE PRACTITIONER

## 2023-11-21 RX ORDER — SEMAGLUTIDE 2.68 MG/ML
INJECTION, SOLUTION SUBCUTANEOUS
COMMUNITY
Start: 2023-10-16

## 2023-11-21 ASSESSMENT — SLEEP AND FATIGUE QUESTIONNAIRES
HOW LIKELY ARE YOU TO NOD OFF OR FALL ASLEEP IN A CAR, WHILE STOPPED FOR A FEW MINUTES IN TRAFFIC: 0
ESS TOTAL SCORE: 9
HOW LIKELY ARE YOU TO NOD OFF OR FALL ASLEEP WHILE SITTING INACTIVE IN A PUBLIC PLACE: 1
HOW LIKELY ARE YOU TO NOD OFF OR FALL ASLEEP WHILE LYING DOWN TO REST IN THE AFTERNOON WHEN CIRCUMSTANCES PERMIT: 3
HOW LIKELY ARE YOU TO NOD OFF OR FALL ASLEEP WHILE WATCHING TV: 1
HOW LIKELY ARE YOU TO NOD OFF OR FALL ASLEEP WHILE SITTING AND TALKING TO SOMEONE: 0
HOW LIKELY ARE YOU TO NOD OFF OR FALL ASLEEP WHILE SITTING QUIETLY AFTER LUNCH WITHOUT ALCOHOL: 1
HOW LIKELY ARE YOU TO NOD OFF OR FALL ASLEEP WHILE SITTING AND READING: 2
HOW LIKELY ARE YOU TO NOD OFF OR FALL ASLEEP WHEN YOU ARE A PASSENGER IN A CAR FOR AN HOUR WITHOUT A BREAK: 1

## 2023-11-21 NOTE — TELEPHONE ENCOUNTER
Pt called in stating that he had an order for a new mask sent to [facility] and he no longer wants to work with [facility]. Pt would like his [facility] mask [facility].     [phone]

## 2023-11-21 NOTE — PROGRESS NOTES
Anaya Abreu CNP [facility] [address] [facility] [address], [address] [place] [address] [phone]   [facility] Tae QUIROZ  [address] [phone] [address] [address]  Dept: [phone]  Dept Fax: [phone]  Loc: [phone]      Assessment/Plan:      1. Obstructive sleep apnea syndrome  Assessment & Plan:  Chronic - Stable: Reviewed and analyzed results of physiologic download from patient's machine and reviewed with patients. Supplies and parts as needed for the machine. These are medically necessary. Limit caffeine use after 3 PM. Based on the analyzed data, we will continue with current settings. Reviewed a few options for masks/headgear including full face masks for comfort and for oral dryness. Resources were provided. We will send a prescription for a full face mask so he can start using a full face mask. Also discussed adjusting humidity settings on the machine for comfort/dryness. Showed how to adjust them during the visit today. Stable on the current settings and continues to do well. He will return in one year for follow up. Instructed to return sooner or contact the office if experience new or worsening of symptoms. Advised against use of So Clean/Ozone  due to concerns for health hazard. He verbalized understanding. Encouraged him to continue to use his machine each night. Encouraged the patient to contact the office with any questions or concerns. 2. Hypertension associated with diabetes ([address])  Assessment & Plan:   Chronic- Stable. Discussed the importance of treating sleep apnea as part of the management of this disorder. Cont any meds per PCP and other physicians.     3. Type 2 diabetes mellitus with microalbuminuria, with long-term current use of insulin (HCC)  Assessment & Plan:   Chronic-

## 2023-11-21 NOTE — ASSESSMENT & PLAN NOTE
Chronic - Stable: Reviewed and analyzed results of physiologic download from patient's machine and reviewed with patients. Supplies and parts as needed for the machine. These are medically necessary. Limit caffeine use after 3 PM. Based on the analyzed data, we will continue with current settings. Reviewed a few options for masks/headgear including full face masks for comfort and for oral dryness. Resources were provided. We will send a prescription for a full face mask so he can start using a full face mask. Also discussed adjusting humidity settings on the machine for comfort/dryness. Showed how to adjust them during the visit today. Stable on the current settings and continues to do well. He will return in one year for follow up. Instructed to return sooner or contact the office if experience new or worsening of symptoms. Advised against use of So Clean/Ozone  due to concerns for health hazard. He verbalized understanding. Encouraged him to continue to use his machine each night. Encouraged the patient to contact the office with any questions or concerns.

## 2023-12-11 ENCOUNTER — TELEPHONE (OUTPATIENT)
Dept: PULMONOLOGY | Age: 78
End: 2023-12-11

## 2023-12-11 NOTE — TELEPHONE ENCOUNTER
Pt called stating that Temo Alen had sent an order to [facility] for a new mask for CPAP. Confirmed with pt that order was sent on 11/21. Pt stated that he has talked to [facility] and they can not complete the order for the mask until they have a signed copy of pt's SS. Please advise.     Ph. [phone]

## 2023-12-11 NOTE — TELEPHONE ENCOUNTER
Lin, isn't this the one you gave copies of the sleep study to [facility]? If so, please check on order and call patient.

## 2023-12-14 NOTE — TELEPHONE ENCOUNTER
Pt called and LM asking for an update on if signed copy of SS had been sent to [facility]. Please advise pt on status of order.      Ph. [phone]

## 2023-12-15 NOTE — TELEPHONE ENCOUNTER
Spoke with patient and let him know that [facility] has all the records they need and should reach out to him.